# Patient Record
Sex: MALE | Race: OTHER
[De-identification: names, ages, dates, MRNs, and addresses within clinical notes are randomized per-mention and may not be internally consistent; named-entity substitution may affect disease eponyms.]

---

## 2022-11-24 ENCOUNTER — HOSPITAL ENCOUNTER (EMERGENCY)
Dept: HOSPITAL 33 - ED | Age: 22
Discharge: HOME | End: 2022-11-24
Payer: COMMERCIAL

## 2022-11-24 VITALS — DIASTOLIC BLOOD PRESSURE: 70 MMHG | SYSTOLIC BLOOD PRESSURE: 110 MMHG | HEART RATE: 70 BPM

## 2022-11-24 VITALS — OXYGEN SATURATION: 100 %

## 2022-11-24 DIAGNOSIS — H92.02: ICD-10-CM

## 2022-11-24 DIAGNOSIS — H61.22: Primary | ICD-10-CM

## 2022-11-24 DIAGNOSIS — Z28.310: ICD-10-CM

## 2022-11-24 PROCEDURE — 69210 REMOVE IMPACTED EAR WAX UNI: CPT

## 2022-11-24 PROCEDURE — 99281 EMR DPT VST MAYX REQ PHY/QHP: CPT

## 2022-11-24 NOTE — ERPHSYRPT
- History of Present Illness


Time Seen by Provider: 11/24/22 09:28


Source: patient


Exam Limitations: no limitations


Patient Subjective Stated Complaint: C/O left earache X 2 days


Triage Nursing Assessment: Patient ambulated back to ED with a cotton ball in 

his left ear. He is alert and oriented. No SOB. Some trouble hearing from left 

ear. Ear canal red.


Physician History: 





21 years old presented in the ER with chief complaint of decreased hearing from 

left ear for 1 week.  He has been cleaning it with Q-tips and putting peroxide 

with no significant relief.  Now complaining of some discomfort.  No discharge. 

No history of otitis media and in the recent past.  No fever or chills reported.


Timing/Duration: gradual onset, weeks


Severity: mild, moderate


ENT Location: ear (L)


Prearrival Treatment: over the counter meds


Associated Symptoms: ear pain (L), change in hearing, No ear drainage, No facial

pain/swelling, No nasal congestion/drainage, No poor fluid intake, No poor 

solids intake, No ringing of ears, No swollen glands, No sinus infection, No 

sore throat, No difficulty swallowing, No voice change


Allergies/Adverse Reactions: 








No Known Drug Allergies Allergy (Verified 11/24/22 08:58)


   





Home Medications: 








No Reportable Medications [No Reported Medications]  11/24/22 [History]





Hx Tetanus, Diphtheria Vaccination/Date Given: Yes


Hx Influenza Vaccination/Date Given: No


Hx Pneumococcal Vaccination/Date Given: No


Immunizations Up to Date: Yes





Travel Risk





- International Travel


Have you traveled outside of the country in past 3 weeks: No





- Coronavirus Screening


Are you exhibiting any of the following symptoms?: No


Close contact with a COVID-19 positive Pt in past 14-21 Days: No





- Vaccine Status


Have you recieved a Covid-19 vaccination: No





- Review of Systems


Constitutional: No Symptoms


Eyes: No Symptoms


Ears, Nose, & Throat: Ear Pain


Respiratory: No Symptoms


Cardiac: No Symptoms


Abdominal/Gastrointestinal: No Symptoms


Musculoskeletal: No Symptoms


Skin: No Symptoms


Neurological: No Symptoms


Endocrine: No Symptoms


Hematologic/Lymphatic: No Symptoms


Immunological/Allergic: No Symptoms





- Past Medical History


Pertinent Past Medical History: No





- Past Surgical History


Past Surgical History: Yes


Other Surgical History: Tubes in ears as a child





- Social History


Smoking Status: Never smoker


Exposure to second hand smoke: No


Drug Use: none


Patient Lives Alone: No





- Nursing Vital Signs


Nursing Vital Signs: 


                               Initial Vital Signs











Temperature  97.8 F   11/24/22 08:59


 


Pulse Rate  80   11/24/22 08:59


 


Respiratory Rate  17   11/24/22 08:59


 


Blood Pressure  109/67   11/24/22 08:59


 


O2 Sat by Pulse Oximetry  100   11/24/22 08:59








                                   Pain Scale











Pain Intensity                 0

















- Physical Exam


General Appearance: no apparent distress, alert


Eye Exam: bilateral eye: normal inspection, PERRL, EOMI


Ear Exam: right ear: canal normal, TM normal, left ear: swelling (Canal with 

dark impacted cerumen), bilateral ear: auricle normal


Nasal Exam: normal inspection


Throat Exam: normal, pharynx normal


Neck Exam: normal inspection, non-tender, supple, full range of motion


Cardiovascular/Respiratory Exam: normal breath sounds, regular rate/rhythm


Neurologic Exam: alert, oriented x 3, cooperative, CNs II-XII nml as tested


Skin Exam: normal color


**SpO2 Interpretation**: normal


SpO2: 100


O2 Delivery: Room Air





Procedures





- Additional Procedures


Progress: 





Procedure note VAX removal left ear.  Time 10:30 AM.  Left ear is flushed with 

warm water, impacted wax is removed.  TM normal.  Mild irritation and swelling 

of canal.





- Progress


Progress: improved


Progress Note: 





11/24/22 10:55


Left ear is irrigated and wax is removed, recommended Tylenol/ibuprofen as 

needed and avoiding Q-tips.  Outpatient follow-up.


Counseled pt/family regarding: diagnosis, need for follow-up





- Departure


Departure Disposition: Home


Clinical Impression: 


 Impacted ear wax





Condition: Stable


Critical Care Time: No


Referrals: 


PETERSON OROSCO NP [Primary Care Provider] - Follow Up with PCP/3 days


Instructions:  Ear Wax Impaction (DC)


Additional Instructions: 


Do not use Q-tips.  Follow-up with primary care for reevaluation.  Take 

Tylenol/ibuprofen as needed for pain.